# Patient Record
Sex: FEMALE | Race: AMERICAN INDIAN OR ALASKA NATIVE
[De-identification: names, ages, dates, MRNs, and addresses within clinical notes are randomized per-mention and may not be internally consistent; named-entity substitution may affect disease eponyms.]

---

## 2020-08-29 ENCOUNTER — HOSPITAL ENCOUNTER (EMERGENCY)
Dept: HOSPITAL 5 - ED | Age: 50
LOS: 1 days | Discharge: HOME | End: 2020-08-30
Payer: SELF-PAY

## 2020-08-29 DIAGNOSIS — I10: ICD-10-CM
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
## 2020-08-29 DIAGNOSIS — D25.9: Primary | ICD-10-CM
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
## 2020-08-29 PROCEDURE — 93005 ELECTROCARDIOGRAM TRACING: CPT

## 2020-08-29 PROCEDURE — 71045 X-RAY EXAM CHEST 1 VIEW: CPT

## 2020-08-29 PROCEDURE — 80053 COMPREHEN METABOLIC PANEL: CPT

## 2020-08-29 PROCEDURE — 99285 EMERGENCY DEPT VISIT HI MDM: CPT

## 2020-08-29 PROCEDURE — 85610 PROTHROMBIN TIME: CPT

## 2020-08-29 PROCEDURE — 96374 THER/PROPH/DIAG INJ IV PUSH: CPT

## 2020-08-29 PROCEDURE — 84484 ASSAY OF TROPONIN QUANT: CPT

## 2020-08-29 PROCEDURE — 74177 CT ABD & PELVIS W/CONTRAST: CPT

## 2020-08-29 PROCEDURE — 72040 X-RAY EXAM NECK SPINE 2-3 VW: CPT

## 2020-08-29 PROCEDURE — 96375 TX/PRO/DX INJ NEW DRUG ADDON: CPT

## 2020-08-29 PROCEDURE — 36415 COLL VENOUS BLD VENIPUNCTURE: CPT

## 2020-08-29 PROCEDURE — 85025 COMPLETE CBC W/AUTO DIFF WBC: CPT

## 2020-08-30 VITALS — DIASTOLIC BLOOD PRESSURE: 82 MMHG | SYSTOLIC BLOOD PRESSURE: 122 MMHG

## 2020-08-30 LAB
ALBUMIN SERPL-MCNC: 3.7 G/DL (ref 3.9–5)
ALT SERPL-CCNC: 8 UNITS/L (ref 7–56)
BASOPHILS # (AUTO): 0 K/MM3 (ref 0–0.1)
BASOPHILS NFR BLD AUTO: 0.3 % (ref 0–1.8)
BUN SERPL-MCNC: 15 MG/DL (ref 7–17)
BUN/CREAT SERPL: 21 %
CALCIUM SERPL-MCNC: 8.9 MG/DL (ref 8.4–10.2)
EOSINOPHIL # BLD AUTO: 0.1 K/MM3 (ref 0–0.4)
EOSINOPHIL NFR BLD AUTO: 0.4 % (ref 0–4.3)
HCT VFR BLD CALC: 30.4 % (ref 30.3–42.9)
HEMOLYSIS INDEX: 2
HGB BLD-MCNC: 9.4 GM/DL (ref 10.1–14.3)
INR PPP: 0.93 (ref 0.87–1.13)
LYMPHOCYTES # BLD AUTO: 1.6 K/MM3 (ref 1.2–5.4)
LYMPHOCYTES NFR BLD AUTO: 10.4 % (ref 13.4–35)
MCHC RBC AUTO-ENTMCNC: 31 % (ref 30–34)
MCV RBC AUTO: 77 FL (ref 79–97)
MONOCYTES # (AUTO): 0.8 K/MM3 (ref 0–0.8)
MONOCYTES % (AUTO): 5.2 % (ref 0–7.3)
PLATELET # BLD: 434 K/MM3 (ref 140–440)
RBC # BLD AUTO: 3.96 M/MM3 (ref 3.65–5.03)

## 2020-08-30 NOTE — XRAY REPORT
CERVICAL SPINE 3 VIEWS



INDICATION / CLINICAL INFORMATION:

neck pain



COMPARISON:

None available.

 

FINDINGS:



BONES / JOINT(S): No acute fracture or subluxation. Mild/moderate DDD localized C5-C6.

SOFT TISSUES: No significant abnormality.



ADDITIONAL FINDINGS: None.







 



Signer Name: Umang Barrow MD 

Signed: 8/30/2020 1:16 AM

Workstation Name: [id number]

## 2020-08-30 NOTE — CAT SCAN REPORT
CT abdomen pelvis w con



INDICATION:  abdominal pain.



TECHNIQUE:

All CT scans at this location are performed using the following dose modulation technique: Automated 
exposure control.



CONTRAST:  Omnipaque 300, 100 cc IV injection.



COMPARISON: None available.



CT ABDOMEN: The parenchymal organs are unremarkable in appearance. Negative for abdominal mass, fluid
 or inflammation.



The bowel is not dilated or thickened.



CT PELVIS: Large fibroid uterus. Negative for mass, fluid or inflammation. The appendix is normal.



IMPRESSION: Large, fibroid uterus.



Signer Name: Umang Barrow MD 

Signed: 8/30/2020 6:57 AM

Workstation Name: [id number]

## 2023-07-12 NOTE — XRAY REPORT
CHEST 1 VIEW 



INDICATION: 

chestpain.



COMPARISON: 

None.



FINDINGS:

Support devices: None.



Heart: Within normal limits. 

Lungs/Pleura: No acute air space or interstitial disease. 



Additional findings: None.



IMPRESSION: No acute abnormality.



Signer Name: Umang Barrow MD 

Signed: 8/30/2020 1:51 AM

Workstation Name: [id number] no phlebitis  no valladares  no calf pain
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
